# Patient Record
(demographics unavailable — no encounter records)

---

## 2024-11-01 NOTE — PHYSICAL EXAM
[General Appearance - In No Acute Distress] : no acute distress [] : no respiratory distress [Oriented To Time, Place, And Person] : oriented to person, place, and time [General Appearance - Well Nourished] : well nourished [General Appearance - Well Developed] : well developed [Normal Appearance] : normal appearance [Well Groomed] : well groomed [de-identified] : very agitiated [FreeTextEntry2] : ALMA CATALAN

## 2024-11-01 NOTE — HISTORY OF PRESENT ILLNESS
[FreeTextEntry1] : Taniya is a 51-year-old with history of lower back pain. Presents to office for chief complaint of right-sided back pain. Initially seen 09/13/2024 stating that the back pain was from a kidney infection despite there being no clinical evidence including negative urine cultures and a negative CT scan.   Since her last appointment she has done 2 urine cultures which have been negative.  1 UA showing 5 RBC with 8 epithelial cells and the other UA showing no RBC.   She also had an MR since last visit showing herniated discs.   She presents to office today reporting continued back pain and now itching throughout her body.  She tells me she has had scabies and other parasitic infections and gotten various anti parasites which made her better but now ID will not see her, and no one will give her more She thinks the parasites are inside her and also on her skin and her nipples and under her breasts and causing her bladder irritative sxs. Patient is insistent that she has a bad infection and needs "powerful antibiotics." She is also specific and says that she needs "anti-parasite medication."   Patient also reports sensation of incomplete bladder emptying.  DANIELLA Beck bladder scanned patient today, PVR 7 cc  Prior: She was in the emergency room May 2024. Kidneys unremarkable. No stones visualized. There was a 1.6 cm hypodensity on her liver and outpatient MRI recommended. She is aware of this finding and will follow-up with her medical doctor.  Urine culture May 2024 negative for infection. She had another urine culture the same month with her medical doctor which showed Enterococcus faecium 10-49,000. She was treated with amoxicillin and states that her back pain improved while on the medication and once she completed the medication her back pain immediately returned. Follow-up urine culture July 2024 negative for infection.

## 2024-11-01 NOTE — ASSESSMENT
[FreeTextEntry1] : Urine culture continue to be negative.  1x 5 RBC on UA this was at a time that she tells me she had her menstrual cycle and there were all more epithelial cells than red cells. Follow up UAs negative.  She is insistent she is not urinating well however by ultrasound she is emptying bladder well. 7 cc. Bladder scan done by RN.   She is insisting that she has a parasite and needs special medication to cure this.  She is insisting she has a urinary tract infection despite me showing her numerous cultures that did not and she Trying to insist that I his urologist should treat her parasites as I am with  Despite my telling her that this is not my area of expertise and I do not prescribe medication that I am not familiar with. Her medical doctor referred her to a ID physician and patient has been having a difficult time getting an appointment.   From a Urologic point of view, there is no findings of infection. No Urinary Retention.  Will send for testing for parasites in urine x 3.  If they are positive we will call her, if they are negative, patient to follow up PRN.  If we can help we will see if we can get her into infectious disease but again I am the urologist not a coordinator.  I suggested she speak to her internist who may have easier access and she was not open to that suggestion

## 2024-11-01 NOTE — ADDENDUM
[FreeTextEntry1] :  Time for this visit is more than I would have expected as she kept asking the same question from different viewpoints trying to get me to prescribe medication that she needs that she needs though I have 1 no evidence that she needs antibiotics, 2 I have no evidence she needs anti parasites and 3 I am not familiar with antiparasitic medication and all.  She finally stopped asking questions after I pointed out that she had asked the same question various methods numerous times including asking me what her gastroenterologist found what he did on endoscopy and a biops y which I have no way of answering and we agreed to test her for ova and parasites and to call her  back if needed

## 2024-11-01 NOTE — LETTER HEADER
[FreeTextEntry3] : Dave Aj MD [employer] [facility], [address] [place] [address]

## 2024-11-01 NOTE — LETTER BODY
Was told to drop off a ua sample today (if she wasn't better), and she's asking if she should get a bottle from us, or use a clean one at home  Was put on medication, & finished it, but problem is re-occurring  [Courtesy Letter:] : I had the pleasure of seeing your patient, [unfilled], in my office today. [Please see my note below.] : Please see my note below. [Sincerely,] : Sincerely, [FreeTextEntry2] : Chon Alva MD [id number] [place] [address]

## 2024-12-11 NOTE — DATA REVIEWED
[FreeTextEntry1] : MRI of the lumbar spine taken on 11/02/2022 showed minimal mid, lower lumbar degenerative disc change.  Unremarkable exam otherwise.  No dawn lumbar disc herniation, spinal canal or foraminal stenosis.  MRI of the lumbar spine taken on 8- showed small central disc protrusion at L5-S1 indenting the ventral thecal sac and contacting the bilateral S1 nerve roots.

## 2024-12-11 NOTE — PROCEDURE
[Trigger point 1-2 muscle groups] : trigger point 1-2 muscle groups [Lumbar paraspinal muscle] : lumbar paraspinal muscle [Pain] : pain [Alcohol] : alcohol [Ethyl Chloride sprayed topically] : ethyl chloride sprayed topically [Sterile technique used] : sterile technique used [___ cc    0.5%] : Bupivacaine (Marcaine) ~Vcc of 0.5%  [___ cc    4mg] : Dexamethasone (Decadron) ~Vcc of 4 mg  [___ cc    30mg] : Ketorolac (Toradol) ~Vcc of 30 mg  [] : Patient tolerated procedure well [Call if redness, pain or fever occur] : call if redness, pain or fever occur [Apply ice for 15min out of every hour for the next 12-24 hours as tolerated] : apply ice for 15 minutes out of every hour for the next 12-24 hours as tolerated [Previous OTC use and PT nontherapeutic] : patient has tried OTC's including aspirin, Ibuprofen, Aleve, etc or prescription NSAIDS, and/or exercises at home and/or physical therapy without satisfactory response [Risks, benefits, alternatives discussed / Verbal consent obtained] : the risks benefits, and alternatives have been discussed, and verbal consent was obtained [de-identified] : Chlorhexidine

## 2024-12-11 NOTE — HISTORY OF PRESENT ILLNESS
[FreeTextEntry1] : Ms. Rich is a 50 year old female presenting to our walk in clinic to establish care for her lower back pain. She states she has had back pain on a chronic basis. She states the flare ups normally last 2-3 days and goes away. This flare up however, she states is not going away. She states she did go to the Urgent care and was prescribed medications.  She notes pain is her lower lumbar spine with radiation going upwards. She denies any radicular pain. She states the pain is associated with stiffness and spasms. She states the pain is worse with rotational movements. She notes pain with sitting, standing or walking secondary to the pain. She currently rates the pain at a 7/10 on the pain scale. She states the pain has been ongoing for the last week and a half. She does not some weakness.  TODAY: Revisit encounter.   Since last visit, she underwent a lumbar trigger point injection on 10/9/2023.  She states she only had minimal relief.  She currently rates her pain as a 9 out of 10 on the pain scale.  She states pain is constant in nature and across the lower back.  She has no radicular symptoms.  She is s/p, pain with any sort of rotational movements.  She has been doing home exercises and physical therapy with no relief.  8-: Revisit encounter. She is a former Dr. Mabry patient who is presenting to establish care. She is presenting with a flare up of lower back pain. She has undergone trigger point injections in the past which were not beneficial. She has pain, which is in the lower lumbar spine, mainly axial. Pain is associated with stiffness and spasms. Pain is made worse with rotational movements. She also notes pain when transitioning from a seated to standing position. Pain has been present daily. She continues to do physical therapy weekly for over 6 weeks which does help during the therapy and then the pain returns.   9-4-2024: Revisit encounter.   She is presenting today with ongoing lower back pain. Her pain is a constant soreness in the back which refers into the buttock. Pain is made worse with rotational movements. She also notes pain when transitioning from a seated to standing position. Pain has been present daily. She has been undergoing physical therapy.   12-: Revisit encounter.   She is presenting today with ongoing pain in the lower back. She is presenting with ongoing soreness in the lower back with spasms. The pain refers into the buttock area. She has pain when sitting, standing or walking for prolonged periods of time. Her pain is rated at a 7/10 on the pain scale. Her pain is present daily. She has been putting off treatment as she was dealing with some health issues which have resolved. She was also seen by an ID doctor and cleared for any infectious issues as she thought she had a parasite.

## 2024-12-11 NOTE — PHYSICAL EXAM
[de-identified] : L spine   No rashes, erythema, edema noted TTP b/l lumbar paraspinal muscles Positive facet loading bilaterally Positive KEMPS test bilaterally Positive pain with lumbar flexion LLE: 5/5 strength RLE: 5/5 strength Sensation intact b/l LE

## 2024-12-11 NOTE — DISCUSSION/SUMMARY
[de-identified] : A discussion regarding available pain management treatment options occurred with the patient. These included interventional, rehabilitative, pharmacological, and alternative modalities. We will proceed with the following:   Interventional treatment options: 1. Done in office today: Lumbar trigger point injection The risks, benefits and alternatives of the proposed procedure were explained in detail with the patient.  The risks outlined include, but are not limited to, infection, bleeding, nerve injury, a temporary increase in pain, failure to resolve symptoms, allergic reaction, and possible elevation of blood sugar in diabetics.  All questions were answered to patient's apparent satisfaction and he/she verbalized an understanding.  - Follow up in 2-3 months. If her pain persists, we will discuss epidural injections. At that time, I will have to get clearance from her ID doctor.   I Gabriella Menezes attest that this documentation has been prepared under the direction and in the presence of provider Dr. Eusebio Winchester.  The documentation recorded by the scribe in my presence, accurately reflects the service I personally performed, and the decisions made by me with my edits as appropriate.   Eusebio Winchester, DO

## 2025-03-06 NOTE — PHYSICAL EXAM
[de-identified] : L spine   No rashes, erythema, edema noted TTP b/l lumbar paraspinal muscles Positive facet loading bilaterally Positive KEMPS test bilaterally Positive pain with lumbar flexion LLE: 5/5 strength RLE: 5/5 strength Sensation intact b/l LE

## 2025-03-06 NOTE — DISCUSSION/SUMMARY
[de-identified] : A discussion regarding available pain management treatment options occurred with the patient. These included interventional, rehabilitative, pharmacological, and alternative modalities. We will proceed with the following:   Interventional treatment options: - None indicated at this time.   Complementary treatment options: - lifestyle modifications discussed - avoid bending and bend with knees - avoid heavy lifting   - Follow up in 2-3 months. If her pain persists, we will discuss epidural injections. At that time, I will have to get clearance from her ID doctor.   Total clinician time spent today on the patient is 30 minutes including preparing to see the patient, obtaining and/or reviewing and confirming history, performing medically necessary and appropriate examination, counseling and educating the patient and/or family, documenting clinical information in the EHR and communicating and/or referring to other healthcare professionals.   I Gabriella Menezes attest that this documentation has been prepared under the direction and in the presence of provider Dr. Eusebio Winchester.  The documentation recorded by the scribe in my presence, accurately reflects the service I personally performed, and the decisions made by me with my edits as appropriate.   Eusebio Winchester, DO

## 2025-03-06 NOTE — HISTORY OF PRESENT ILLNESS
[FreeTextEntry1] : Ms. Rich is a 50 year old female presenting to our walk in clinic to establish care for her lower back pain. She states she has had back pain on a chronic basis. She states the flare ups normally last 2-3 days and goes away. This flare up however, she states is not going away. She states she did go to the Urgent care and was prescribed medications.  She notes pain is her lower lumbar spine with radiation going upwards. She denies any radicular pain. She states the pain is associated with stiffness and spasms. She states the pain is worse with rotational movements. She notes pain with sitting, standing or walking secondary to the pain. She currently rates the pain at a 7/10 on the pain scale. She states the pain has been ongoing for the last week and a half. She does not some weakness.  TODAY: Revisit encounter.   Since last visit, she underwent a lumbar trigger point injection on 10/9/2023.  She states she only had minimal relief.  She currently rates her pain as a 9 out of 10 on the pain scale.  She states pain is constant in nature and across the lower back.  She has no radicular symptoms.  She is s/p, pain with any sort of rotational movements.  She has been doing home exercises and physical therapy with no relief.  8-: Revisit encounter. She is a former Dr. Mabry patient who is presenting to establish care. She is presenting with a flare up of lower back pain. She has undergone trigger point injections in the past which were not beneficial. She has pain, which is in the lower lumbar spine, mainly axial. Pain is associated with stiffness and spasms. Pain is made worse with rotational movements. She also notes pain when transitioning from a seated to standing position. Pain has been present daily. She continues to do physical therapy weekly for over 6 weeks which does help during the therapy and then the pain returns.   9-4-2024: Revisit encounter.   She is presenting today with ongoing lower back pain. Her pain is a constant soreness in the back which refers into the buttock. Pain is made worse with rotational movements. She also notes pain when transitioning from a seated to standing position. Pain has been present daily. She has been undergoing physical therapy.   12-: Revisit encounter.   She is presenting today with ongoing pain in the lower back. She is presenting with ongoing soreness in the lower back with spasms. The pain refers into the buttock area. She has pain when sitting, standing or walking for prolonged periods of time. Her pain is rated at a 7/10 on the pain scale. Her pain is present daily. She has been putting off treatment as she was dealing with some health issues which have resolved. She was also seen by an ID doctor and cleared for any infectious issues as she thought she had a parasite.   3-5-2025: Revisit encounter.   She is presenting today with intermittent pain in the lower back. She states the pain comes and goes however she feels a constant pain in the lower back region. She has a feeling of a constant soreness in the lower back. Her pain is made worse when sitting, standing or walking for prolonged periods of time. Her pain is rated anywhere from a 3 to an 7/10 on the pain scale.

## 2025-03-06 NOTE — PHYSICAL EXAM
[de-identified] : L spine   No rashes, erythema, edema noted TTP b/l lumbar paraspinal muscles Positive facet loading bilaterally Positive KEMPS test bilaterally Positive pain with lumbar flexion LLE: 5/5 strength RLE: 5/5 strength Sensation intact b/l LE

## 2025-03-06 NOTE — DISCUSSION/SUMMARY
[de-identified] : A discussion regarding available pain management treatment options occurred with the patient. These included interventional, rehabilitative, pharmacological, and alternative modalities. We will proceed with the following:   Interventional treatment options: - None indicated at this time.   Complementary treatment options: - lifestyle modifications discussed - avoid bending and bend with knees - avoid heavy lifting   - Follow up in 2-3 months. If her pain persists, we will discuss epidural injections. At that time, I will have to get clearance from her ID doctor.   Total clinician time spent today on the patient is 30 minutes including preparing to see the patient, obtaining and/or reviewing and confirming history, performing medically necessary and appropriate examination, counseling and educating the patient and/or family, documenting clinical information in the EHR and communicating and/or referring to other healthcare professionals.   I Gabriella Menezes attest that this documentation has been prepared under the direction and in the presence of provider Dr. Eusebio Winchester.  The documentation recorded by the scribe in my presence, accurately reflects the service I personally performed, and the decisions made by me with my edits as appropriate.   Eusebio Winchester, DO